# Patient Record
Sex: FEMALE | Race: WHITE
[De-identification: names, ages, dates, MRNs, and addresses within clinical notes are randomized per-mention and may not be internally consistent; named-entity substitution may affect disease eponyms.]

---

## 2022-04-04 ENCOUNTER — APPOINTMENT (OUTPATIENT)
Dept: ORTHOPEDIC SURGERY | Facility: CLINIC | Age: 57
End: 2022-04-04
Payer: OTHER MISCELLANEOUS

## 2022-04-04 VITALS — WEIGHT: 293 LBS | BODY MASS INDEX: 45.99 KG/M2 | HEIGHT: 67 IN

## 2022-04-04 PROBLEM — Z00.00 ENCOUNTER FOR PREVENTIVE HEALTH EXAMINATION: Status: ACTIVE | Noted: 2022-04-04

## 2022-04-04 PROCEDURE — 99072 ADDL SUPL MATRL&STAF TM PHE: CPT

## 2022-04-04 PROCEDURE — 99214 OFFICE O/P EST MOD 30 MIN: CPT

## 2022-04-04 NOTE — WORK
[Sprain/Strain] : sprain/strain [Was the competent medical cause of the injury] : was the competent medical cause of the injury [Are consistent with the injury] : are consistent with the injury [Consistent with my objective findings] : consistent with my objective findings [Total] : total [Does not reveal pre-existing condition(s) that may affect treatment/prognosis] : does not reveal pre-existing condition(s) that may affect treatment/prognosis [I provided the services listed above] :  I provided the services listed above.

## 2022-04-11 ENCOUNTER — APPOINTMENT (OUTPATIENT)
Dept: ORTHOPEDIC SURGERY | Facility: CLINIC | Age: 57
End: 2022-04-11

## 2022-04-13 NOTE — DATA REVIEWED
[Knee] : knee [MRI] : MRI [Right] : of the right [FreeTextEntry1] : [facility] 3/3/22 Lt knee MRI: 1. Severe patellofemoral, mild to moderate lateral and mild medial compartmental osteoarthritis.  2. Small degenerative tear  posterior horn of the medial meniscus. Degenerative tear of the anterior horn of the lateral meniscus.  3. Small  joint effusion and synovitis. Small to moderate popliteal cyst. 4. Small superficial infrapatellar bursitis. 5. Intact cruciate and collateral ligaments. 6. patella izzy.  [FreeTextEntry2] : [facility] 3/2/22 Rt knee MRI: 1 Severe patellofemoral, mild medial and lateral compartmental osteoarthritis. 2 Small peripheral longitudal horizontal tear of the anterior horn of the lateral meniscus. Small intrasubstance degeneration of the posterior horn of the medial meniscus . 3Small to moderate joint effusion and synovitis. 4. Confluent crescentic anterior infrapatellar subcutaneous fluid collection extends below the tibial tubercle compatible with a superficial infrapatellar bursitis. It measures up to 7.2 X 3.3 cm. 5. intact cruciate and collateral ligaments.

## 2022-04-13 NOTE — HISTORY OF PRESENT ILLNESS
[Work related] : work related [Not working due to injury] : Work status: not working due to injury [] : yes [de-identified] : Patient presents for WC visit. This is the first time she is being seen by . The patient was injured at her place of work ( [employer]). The injury was caused by her falling on her knees. She now has bilateral knee pain. She has been out of work since 3/11/22. She recently received a CSI from us which helped relieve symptoms. Has been completing pt with  and using the thermax machine. She reports that her symptoms has improved a lot since the injury.  [FreeTextEntry1] : bilateral knees.  [FreeTextEntry3] : 2/11/22

## 2022-04-13 NOTE — ASSESSMENT
[FreeTextEntry1] : She will continue pt for 3-4 weeks. We may consider hyaluronic acid injections if symptoms do not improve.

## 2022-04-13 NOTE — PHYSICAL EXAM
[Bilateral] : knee bilaterally [] : no deformities of patellar tendon [TWNoteComboBox7] : flexion 105 degrees [de-identified] : extension 0 degrees

## 2022-05-02 ENCOUNTER — APPOINTMENT (OUTPATIENT)
Dept: ORTHOPEDIC SURGERY | Facility: CLINIC | Age: 57
End: 2022-05-02
Payer: OTHER MISCELLANEOUS

## 2022-05-02 VITALS — HEIGHT: 67 IN | WEIGHT: 293 LBS | BODY MASS INDEX: 45.99 KG/M2

## 2022-05-02 DIAGNOSIS — M25.562 PAIN IN RIGHT KNEE: ICD-10-CM

## 2022-05-02 DIAGNOSIS — S89.92XA UNSPECIFIED INJURY OF LEFT LOWER LEG, INITIAL ENCOUNTER: ICD-10-CM

## 2022-05-02 DIAGNOSIS — J45.909 UNSPECIFIED ASTHMA, UNCOMPLICATED: ICD-10-CM

## 2022-05-02 DIAGNOSIS — Z78.9 OTHER SPECIFIED HEALTH STATUS: ICD-10-CM

## 2022-05-02 DIAGNOSIS — E78.00 PURE HYPERCHOLESTEROLEMIA, UNSPECIFIED: ICD-10-CM

## 2022-05-02 DIAGNOSIS — M25.561 PAIN IN RIGHT KNEE: ICD-10-CM

## 2022-05-02 DIAGNOSIS — S89.91XA UNSPECIFIED INJURY OF RIGHT LOWER LEG, INITIAL ENCOUNTER: ICD-10-CM

## 2022-05-02 PROCEDURE — 99072 ADDL SUPL MATRL&STAF TM PHE: CPT

## 2022-05-02 PROCEDURE — 99214 OFFICE O/P EST MOD 30 MIN: CPT

## 2022-05-02 NOTE — PHYSICAL EXAM
[Bilateral] : knee bilaterally [5___] : hamstring 5[unfilled]/5 [de-identified] : Neurologic: normal coordination, normal DTR UE/LE , normal sensation, normal mood and affect, orientated and able to communicate.\par \par Skin: normal skin, no rash, no ulcers and no lesions.\par \par Lymphatic: no obvious lymphadenopathy in areas examined.\par \par Constitutional: well developed and well nourished.\par \par Cardiovascular: peripheral vascular exam is grossly normal.\par \par Pulmonary: no respiratory distress, lungs clear to auscultation bilaterally.\par \par Abdomen: normal bowel sounds, non-tender, no HSM and no mass.\par  [] : no calf tenderness

## 2022-05-02 NOTE — WORK
[Sprain/Strain] : sprain/strain [Was the competent medical cause of the injury] : was the competent medical cause of the injury [Are consistent with the injury] : are consistent with the injury [Consistent with my objective findings] : consistent with my objective findings [I provided the services listed above] :  I provided the services listed above.

## 2022-05-02 NOTE — ASSESSMENT
[FreeTextEntry1] : Patient will continue pt , she can return to work. She will follow up as needed. \par \par "Written by Justice Medina, acting as Scribe for Darrell Tyler M.D" \par \par Home Exercise \par \par  The patient is instructed on a home exercise program. \par  \par RICE \par I explained to the patient that rest, ice, compression, and elevation would benefit them.  They may return to activity after follow-up or when they no longer have any pain. \par  \par Pain Guide Activities \par The patient was advised to let pain guide the gradual advancement of activities. \par  \par Activity Modification \par The patient was advised to modify their activities. \par  \par Dx / Natural History \par The patient was advised of the diagnosis.  The natural history of the pathology was explained in full to the patient in layman's terms.  Several different treatment options were discussed and explained in full to the patient including the risks and benefits of both surgical and non-surgical treatments.  All questions and concerns were answered.

## 2022-05-02 NOTE — HISTORY OF PRESENT ILLNESS
[de-identified] : The patient is a 56 year  old right hand dominant female who presents today complaining of andrez knee.  This is a FUV, she has been attending pt and feels like she has improved. She reports that pt has been so helpful. \par Date of Injury/Onset: 2/11/22 \par Pain:    At Rest: _/10 \par With Activity:  _/10 \par Mechanism of injury: _\par This is  a Work Related Injury being treated under Worker's Compensation.\par This is NOT an athletic injury occurring associated with an interscholastic or organized sports team.\par Quality of symptoms: _\par Improves with: _\par Worse with: _\par Treatment/Imaging/Studies Since Last Visit: PT 3x week\par 	Reports Available For Review Today: _\par Out of work/sport: _, since _\par School/Sport/Position/Occupation:_\par Change since last visit: states doing much better since last visit\par Additional Information: None\par

## 2022-06-01 ENCOUNTER — APPOINTMENT (OUTPATIENT)
Dept: CARDIOLOGY | Facility: CLINIC | Age: 57
End: 2022-06-01
Payer: MEDICAID

## 2022-06-01 ENCOUNTER — NON-APPOINTMENT (OUTPATIENT)
Age: 57
End: 2022-06-01

## 2022-06-01 VITALS
WEIGHT: 290 LBS | HEIGHT: 67 IN | DIASTOLIC BLOOD PRESSURE: 80 MMHG | BODY MASS INDEX: 45.52 KG/M2 | SYSTOLIC BLOOD PRESSURE: 120 MMHG | HEART RATE: 84 BPM | OXYGEN SATURATION: 95 %

## 2022-06-01 DIAGNOSIS — Z01.810 ENCOUNTER FOR PREPROCEDURAL CARDIOVASCULAR EXAMINATION: ICD-10-CM

## 2022-06-01 DIAGNOSIS — E78.5 HYPERLIPIDEMIA, UNSPECIFIED: ICD-10-CM

## 2022-06-01 PROCEDURE — 99204 OFFICE O/P NEW MOD 45 MIN: CPT | Mod: 25

## 2022-06-01 PROCEDURE — 93000 ELECTROCARDIOGRAM COMPLETE: CPT

## 2022-06-01 NOTE — DISCUSSION/SUMMARY
[FreeTextEntry1] : Pt is a 57 y/o F who is referred here today by their PCP for evaluation.  Pt has PMH HLD, MARCI on CPAP, BMI 45.  She is contemplating bariatric surgery.  Pt reports feeling well and has no active cardiac complaints - denies CP, SOB, palpitations, dizziness, syncope, edema, orthopnea, PND, orthopnea.  No exertional symptoms. \par Will check transthoracic echocardiogram to evaluate left ventricular function and assess for any structural abnormalities\par will perform ETT to assess patient's current cardiac reserve to incremental activity and check for provocable ECG changes.\par VSS\par Further preop recommendations will be made once diagnostic testing is complete. \par The described plan was discussed with the pt.  All questions and concerns were addressed to the best of my knowledge.

## 2022-06-01 NOTE — HISTORY OF PRESENT ILLNESS
[FreeTextEntry1] : Pt is a 57 y/o F who is referred here today by their PCP for evaluation.  Pt has PMH HLD, MARCI on CPAP, BMI 45.  She is contemplating bariatric surgery.  Pt reports feeling well and has no active cardiac complaints - denies CP, SOB, palpitations, dizziness, syncope, edema, orthopnea, PND, orthopnea.  No exertional symptoms. \par Her mother is a pt here\par COVID vaccine 03/2021, booster 10/2021 \par COVID+ 05/2022, not hospitalized\par \par PMH: HLD, BMI 45, MARCI on CPAP\par PCP Dr Abarca with [facility]\par Smoking status: never\par social ETOH\par no drug use\par Current exercise: PT \par Daily water intake: 24 oz\par Daily caffeine intake: 1 cup coffee\par OTC medications: MVI, ASA 81mg qd, vit D\par Previous cardiac testing: none\par Previous hospitalizations: none\par 1 child - no problem with pregnancy\par

## 2022-06-27 ENCOUNTER — APPOINTMENT (OUTPATIENT)
Dept: CARDIOLOGY | Facility: CLINIC | Age: 57
End: 2022-06-27

## 2022-06-27 PROCEDURE — 93306 TTE W/DOPPLER COMPLETE: CPT

## 2022-07-13 ENCOUNTER — APPOINTMENT (OUTPATIENT)
Dept: CARDIOLOGY | Facility: CLINIC | Age: 57
End: 2022-07-13

## 2022-07-13 PROCEDURE — 93015 CV STRESS TEST SUPVJ I&R: CPT

## 2022-09-13 ENCOUNTER — APPOINTMENT (OUTPATIENT)
Dept: CARDIOLOGY | Facility: CLINIC | Age: 57
End: 2022-09-13

## 2023-06-13 ENCOUNTER — APPOINTMENT (OUTPATIENT)
Dept: INTERNAL MEDICINE | Facility: CLINIC | Age: 58
End: 2023-06-13

## 2024-08-14 ENCOUNTER — NON-APPOINTMENT (OUTPATIENT)
Age: 59
End: 2024-08-14

## 2024-08-14 ENCOUNTER — APPOINTMENT (OUTPATIENT)
Dept: CARDIOLOGY | Facility: CLINIC | Age: 59
End: 2024-08-14
Payer: COMMERCIAL

## 2024-08-14 VITALS
HEIGHT: 67 IN | SYSTOLIC BLOOD PRESSURE: 122 MMHG | OXYGEN SATURATION: 98 % | HEART RATE: 78 BPM | DIASTOLIC BLOOD PRESSURE: 70 MMHG

## 2024-08-14 DIAGNOSIS — R07.9 CHEST PAIN, UNSPECIFIED: ICD-10-CM

## 2024-08-14 DIAGNOSIS — E78.5 HYPERLIPIDEMIA, UNSPECIFIED: ICD-10-CM

## 2024-08-14 PROCEDURE — 99214 OFFICE O/P EST MOD 30 MIN: CPT | Mod: 25

## 2024-08-14 PROCEDURE — 93000 ELECTROCARDIOGRAM COMPLETE: CPT

## 2024-08-14 RX ORDER — ROSUVASTATIN CALCIUM 10 MG/1
10 TABLET, FILM COATED ORAL
Refills: 0 | Status: ACTIVE | COMMUNITY
Start: 2024-08-14

## 2024-08-14 RX ORDER — ESCITALOPRAM OXALATE 10 MG/1
10 TABLET, FILM COATED ORAL
Refills: 0 | Status: ACTIVE | COMMUNITY
Start: 2024-08-14

## 2024-08-14 RX ORDER — ALPRAZOLAM 0.5 MG/1
0.5 TABLET ORAL
Refills: 0 | Status: ACTIVE | COMMUNITY
Start: 2024-08-14
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
## 2024-09-26 ENCOUNTER — APPOINTMENT (OUTPATIENT)
Dept: CARDIOLOGY | Facility: CLINIC | Age: 59
End: 2024-09-26
Payer: COMMERCIAL

## 2024-09-26 PROCEDURE — 93306 TTE W/DOPPLER COMPLETE: CPT

## 2024-11-13 ENCOUNTER — RESULT REVIEW (OUTPATIENT)
Age: 59
End: 2024-11-13

## 2024-11-13 ENCOUNTER — APPOINTMENT (OUTPATIENT)
Dept: CT IMAGING | Facility: CLINIC | Age: 59
End: 2024-11-13
Payer: COMMERCIAL

## 2024-11-13 PROCEDURE — 75574 CT ANGIO HRT W/3D IMAGE: CPT

## 2024-12-05 DIAGNOSIS — R93.1 ABNORMAL FINDINGS ON DIAGNOSTIC IMAGING OF HEART AND CORONARY CIRCULATION: ICD-10-CM
